# Patient Record
Sex: MALE | Race: WHITE | NOT HISPANIC OR LATINO | Employment: FULL TIME | URBAN - METROPOLITAN AREA
[De-identification: names, ages, dates, MRNs, and addresses within clinical notes are randomized per-mention and may not be internally consistent; named-entity substitution may affect disease eponyms.]

---

## 2022-09-30 ENCOUNTER — OFFICE VISIT (OUTPATIENT)
Dept: FAMILY MEDICINE CLINIC | Facility: CLINIC | Age: 47
End: 2022-09-30

## 2022-09-30 ENCOUNTER — LAB (OUTPATIENT)
Dept: LAB | Facility: HOSPITAL | Age: 47
End: 2022-09-30

## 2022-09-30 VITALS
HEART RATE: 74 BPM | DIASTOLIC BLOOD PRESSURE: 80 MMHG | OXYGEN SATURATION: 96 % | TEMPERATURE: 97.3 F | BODY MASS INDEX: 22.72 KG/M2 | WEIGHT: 177 LBS | HEIGHT: 74 IN | SYSTOLIC BLOOD PRESSURE: 120 MMHG

## 2022-09-30 DIAGNOSIS — Z00.00 ENCOUNTER FOR SCREENING AND PREVENTATIVE CARE: ICD-10-CM

## 2022-09-30 DIAGNOSIS — Z00.00 ANNUAL PHYSICAL EXAM: Primary | ICD-10-CM

## 2022-09-30 DIAGNOSIS — Z23 IMMUNIZATION DUE: ICD-10-CM

## 2022-09-30 DIAGNOSIS — Z11.59 ENCOUNTER FOR HEPATITIS C SCREENING TEST FOR LOW RISK PATIENT: ICD-10-CM

## 2022-09-30 DIAGNOSIS — Z87.891 FORMER TOBACCO USE: ICD-10-CM

## 2022-09-30 DIAGNOSIS — Z00.00 ANNUAL PHYSICAL EXAM: ICD-10-CM

## 2022-09-30 PROCEDURE — 91312 COVID-19 (PFIZER) BIVALENT BOOSTER 12+YRS: CPT | Performed by: STUDENT IN AN ORGANIZED HEALTH CARE EDUCATION/TRAINING PROGRAM

## 2022-09-30 PROCEDURE — 90715 TDAP VACCINE 7 YRS/> IM: CPT | Performed by: STUDENT IN AN ORGANIZED HEALTH CARE EDUCATION/TRAINING PROGRAM

## 2022-09-30 PROCEDURE — 90471 IMMUNIZATION ADMIN: CPT | Performed by: STUDENT IN AN ORGANIZED HEALTH CARE EDUCATION/TRAINING PROGRAM

## 2022-09-30 PROCEDURE — 99386 PREV VISIT NEW AGE 40-64: CPT | Performed by: STUDENT IN AN ORGANIZED HEALTH CARE EDUCATION/TRAINING PROGRAM

## 2022-09-30 PROCEDURE — 0124A PR ADM SARSCOV2 30MCG/0.3ML BST: CPT | Performed by: STUDENT IN AN ORGANIZED HEALTH CARE EDUCATION/TRAINING PROGRAM

## 2022-09-30 NOTE — PROGRESS NOTES
New Patient Office Visit      Patient Name: Rafael Ruiz  : 1975   MRN: [id number]   Care Team: Patient Care Team:  Renetta Bass DO as PCP - General (Internal Medicine)    Chief Complaint:    Chief Complaint   Patient presents with   • new patient preventive medicine service   • wants covid booster       History of Present Illness: Rafael Ruiz is a 47 y.o. male who is here today to establish care. He works for [employer].  with one child. No acute complaints, feeling well today.     He has not been to the doctors office in decades. No known medical problems.   He reports room to work on his diet. Enjoys convenient options.  Former smoker quit 9/15/2011. About 1ppd x 25 years.   Rare alcohol use socially   Reports good, stable mood. Has never struggled with anxiety/depression.    He has never had colonoscopy but is agreeable to doing this. He is familiar with [facility] and would like to reach out to them for this.     Family history   Father -T2DM  Mother - skin cancer   Maternal grandfather - unsure if colon or prostate cancer     No family history of CAD, MI, CVA, VTE, HTN, HLD.         Subjective      Review of Systems:   Review of Systems - See HPI    Past Medical History: History reviewed. No pertinent past medical history.    Past Surgical History:   Past Surgical History:   Procedure Laterality Date   • LUNG SURGERY     • WISDOM TOOTH EXTRACTION         Family History:   Family History   Problem Relation Age of Onset   • Skin cancer Mother    • Diabetes Father        Social History:   Social History     Socioeconomic History   • Marital status:    Tobacco Use   • Smoking status: Former Smoker     Packs/day: 1.00     Years: 25.00     Pack years: 25.00     Types: Cigarettes     Start date:      Quit date: 2011     Years since quittin.7   • Smokeless tobacco: Never Used   Vaping Use   • Vaping Use: Never used   Substance and Sexual Activity   • Alcohol use: Yes   • Drug use:  "Not Currently   • Sexual activity: Yes     Partners: Female       Tobacco History:   Social History     Tobacco Use   Smoking Status Former Smoker   • Packs/day: 1.00   • Years: 25.00   • Pack years: 25.00   • Types: Cigarettes   • Start date:    • Quit date:    • Years since quittin.7   Smokeless Tobacco Never Used       Medications:   No current outpatient medications on file.    Allergies:   No Known Allergies    Objective     Physical Exam:  Vital Signs:   Vitals:    22 1439   BP: 120/80   Pulse: 74   Temp: 97.3 °F (36.3 °C)   SpO2: 96%   Weight: 80.3 kg (177 lb)   Height: 188 cm (74\")   PainSc: 0-No pain     Body mass index is 22.73 kg/m².     Physical Exam  Vitals reviewed.   Constitutional:       Appearance: Normal appearance. He is normal weight. He is not ill-appearing.   Cardiovascular:      Rate and Rhythm: Normal rate and regular rhythm.      Pulses: Normal pulses.      Heart sounds: Normal heart sounds. No murmur heard.  Pulmonary:      Effort: Pulmonary effort is normal. No respiratory distress.      Breath sounds: Normal breath sounds. No wheezing.   Abdominal:      General: Abdomen is flat. There is no distension.   Skin:     General: Skin is warm and dry.   Neurological:      Mental Status: He is alert.   Psychiatric:         Mood and Affect: Mood normal.         Behavior: Behavior normal.         Judgment: Judgment normal.         Assessment / Plan      Assessment/Plan:   Problems Addressed This Visit  Diagnoses and all orders for this visit:    1. Annual physical exam (Primary)  -     CBC (No Diff); Future  -     Lipid Panel; Future  -     Hemoglobin A1c; Future  -     Comprehensive Metabolic Panel; Future  -     TSH; Future  -     COVID-19 Bivalent Booster (Pfizer) 12+yrs  -     Tdap Vaccine Greater Than or Equal To 8yo IM  -     Hepatitis C antibody; Future    2. Encounter for screening and preventative care  -     CBC (No Diff); Future  -     Lipid Panel; Future  -     " Hemoglobin A1c; Future  -     Comprehensive Metabolic Panel; Future  -     TSH; Future  -     Hepatitis C antibody; Future    3. Former tobacco use  -     Hepatitis C Antibody; Future  -     TSH; Future  -     Comprehensive Metabolic Panel; Future  -     Hemoglobin A1c; Future  -     Lipid Panel; Future  -     CBC (No Diff); Future    4. Immunization due  -     COVID-19 Bivalent Booster (Pfizer) 12+yrs  -     Tdap Vaccine Greater Than or Equal To 8yo IM    5. Encounter for hepatitis C screening test for low risk patient  -     Hepatitis C antibody; Future    Healthcare Maintenance   Vaccines:  -COVID19 booster due, given today  -Influenza due - will plan to get this another time  -Tdap due, given today  -Shingrix due at age 48    Cancer Screening  -Colonoscopy due - encouraged this, patient to reach out to [facility] per his preference   -Lung cancer screening - discuss at age 48    Other  -AAA screening - discuss at age 63  -Counseled on importance of maintaining healthy diet and routine exercise. Encouraged 150 min exercise per week.  -Blood pressure goal <130/80  -Metabolic screening due today   -His mood is stable   -Encouraged routine dental and eye exams. He is due for both.    Plan of care reviewed with patient at the conclusion of today's visit. Education was provided regarding diagnosis and management.  Patient verbalizes understanding of and agreement with management plan.      Follow Up:   Return in about 1 year (around 9/30/2023) for Annual.          DO VIRAJ Brewer RD  [facility]  [address]  [facility] [address]  Fax [phone]  Phone [phone]

## 2022-10-01 LAB
ALBUMIN SERPL-MCNC: 4.5 G/DL (ref 3.5–5.2)
ALBUMIN/GLOB SERPL: 2.3 G/DL
ALP SERPL-CCNC: 80 U/L (ref 39–117)
ALT SERPL-CCNC: 7 U/L (ref 1–41)
AST SERPL-CCNC: 15 U/L (ref 1–40)
BILIRUB SERPL-MCNC: 0.3 MG/DL (ref 0–1.2)
BUN SERPL-MCNC: 11 MG/DL (ref 6–20)
BUN/CREAT SERPL: 12.5 (ref 7–25)
CALCIUM SERPL-MCNC: 9.2 MG/DL (ref 8.6–10.5)
CHLORIDE SERPL-SCNC: 100 MMOL/L (ref 98–107)
CHOLEST SERPL-MCNC: 156 MG/DL (ref 0–200)
CO2 SERPL-SCNC: 27.4 MMOL/L (ref 22–29)
CREAT SERPL-MCNC: 0.88 MG/DL (ref 0.76–1.27)
EGFRCR SERPLBLD CKD-EPI 2021: 105.4 ML/MIN/1.73
ERYTHROCYTE [DISTWIDTH] IN BLOOD BY AUTOMATED COUNT: 12.2 % (ref 12.3–15.4)
GLOBULIN SER CALC-MCNC: 2 GM/DL
GLUCOSE SERPL-MCNC: 96 MG/DL (ref 65–99)
HBA1C MFR BLD: 5.7 % (ref 4.8–5.6)
HCT VFR BLD AUTO: 46.8 % (ref 37.5–51)
HCV AB S/CO SERPL IA: <0.1 S/CO RATIO (ref 0–0.9)
HDLC SERPL-MCNC: 45 MG/DL (ref 40–60)
HGB BLD-MCNC: 15.6 G/DL (ref 13–17.7)
LDLC SERPL CALC-MCNC: 87 MG/DL (ref 0–100)
MCH RBC QN AUTO: 30.2 PG (ref 26.6–33)
MCHC RBC AUTO-ENTMCNC: 33.3 G/DL (ref 31.5–35.7)
MCV RBC AUTO: 90.7 FL (ref 79–97)
PLATELET # BLD AUTO: 293 10*3/MM3 (ref 140–450)
POTASSIUM SERPL-SCNC: 4.3 MMOL/L (ref 3.5–5.2)
PROT SERPL-MCNC: 6.5 G/DL (ref 6–8.5)
RBC # BLD AUTO: 5.16 10*6/MM3 (ref 4.14–5.8)
SODIUM SERPL-SCNC: 139 MMOL/L (ref 136–145)
TRIGL SERPL-MCNC: 136 MG/DL (ref 0–150)
TSH SERPL DL<=0.005 MIU/L-ACNC: 1.97 UIU/ML (ref 0.27–4.2)
VLDLC SERPL CALC-MCNC: 24 MG/DL (ref 5–40)
WBC # BLD AUTO: 9.8 10*3/MM3 (ref 3.4–10.8)

## 2022-10-03 ENCOUNTER — TELEPHONE (OUTPATIENT)
Dept: FAMILY MEDICINE CLINIC | Facility: CLINIC | Age: 47
End: 2022-10-03

## 2022-10-03 NOTE — TELEPHONE ENCOUNTER
Caller: Rafael Ruiz    Relationship to patient: Self    Best call back number: [phone]    Patient is needing: PATIENT WAS RETURNING CALL ABOUT LAB RESULTS.

## 2024-10-25 ENCOUNTER — LAB (OUTPATIENT)
Dept: LAB | Facility: HOSPITAL | Age: 49
End: 2024-10-25
Payer: COMMERCIAL

## 2024-10-25 ENCOUNTER — OFFICE VISIT (OUTPATIENT)
Dept: FAMILY MEDICINE CLINIC | Facility: CLINIC | Age: 49
End: 2024-10-25
Payer: COMMERCIAL

## 2024-10-25 VITALS
WEIGHT: 172 LBS | DIASTOLIC BLOOD PRESSURE: 72 MMHG | HEIGHT: 74 IN | BODY MASS INDEX: 22.07 KG/M2 | OXYGEN SATURATION: 98 % | SYSTOLIC BLOOD PRESSURE: 118 MMHG | HEART RATE: 89 BPM

## 2024-10-25 DIAGNOSIS — Z12.11 COLON CANCER SCREENING: ICD-10-CM

## 2024-10-25 DIAGNOSIS — I49.9 IRREGULAR HEART RHYTHM: ICD-10-CM

## 2024-10-25 DIAGNOSIS — Z00.00 ANNUAL PHYSICAL EXAM: Primary | ICD-10-CM

## 2024-10-25 DIAGNOSIS — R00.2 INTERMITTENT PALPITATIONS: ICD-10-CM

## 2024-10-25 DIAGNOSIS — Z12.5 PROSTATE CANCER SCREENING: ICD-10-CM

## 2024-10-25 DIAGNOSIS — Z00.00 ANNUAL PHYSICAL EXAM: ICD-10-CM

## 2024-10-25 DIAGNOSIS — I49.8 JUNCTIONAL RHYTHM: ICD-10-CM

## 2024-10-25 DIAGNOSIS — Z13.29 SCREENING FOR THYROID DISORDER: ICD-10-CM

## 2024-10-25 DIAGNOSIS — Z87.891 PERSONAL HISTORY OF NICOTINE DEPENDENCE: ICD-10-CM

## 2024-10-25 DIAGNOSIS — R73.03 PREDIABETES: ICD-10-CM

## 2024-10-25 DIAGNOSIS — B35.1 ONYCHOMYCOSIS: ICD-10-CM

## 2024-10-25 DIAGNOSIS — Z13.220 SCREENING FOR CHOLESTEROL LEVEL: ICD-10-CM

## 2024-10-25 DIAGNOSIS — Z13.228 SCREENING FOR METABOLIC DISORDER: ICD-10-CM

## 2024-10-25 DIAGNOSIS — Z12.2 ENCOUNTER FOR SCREENING FOR LUNG CANCER: ICD-10-CM

## 2024-10-25 LAB
ALBUMIN SERPL-MCNC: 4.2 G/DL (ref 3.5–5.2)
ALBUMIN/GLOB SERPL: 1.4 G/DL
ALP SERPL-CCNC: 89 U/L (ref 39–117)
ALT SERPL W P-5'-P-CCNC: 6 U/L (ref 1–41)
ANION GAP SERPL CALCULATED.3IONS-SCNC: 8.8 MMOL/L (ref 5–15)
AST SERPL-CCNC: 17 U/L (ref 1–40)
BILIRUB SERPL-MCNC: 0.6 MG/DL (ref 0–1.2)
BUN SERPL-MCNC: 11 MG/DL (ref 6–20)
BUN/CREAT SERPL: 9.8 (ref 7–25)
CALCIUM SPEC-SCNC: 9.4 MG/DL (ref 8.6–10.5)
CHLORIDE SERPL-SCNC: 104 MMOL/L (ref 98–107)
CHOLEST SERPL-MCNC: 150 MG/DL (ref 0–200)
CO2 SERPL-SCNC: 26.2 MMOL/L (ref 22–29)
CREAT SERPL-MCNC: 1.12 MG/DL (ref 0.76–1.27)
DEPRECATED RDW RBC AUTO: 39.1 FL (ref 37–54)
EGFRCR SERPLBLD CKD-EPI 2021: 79.5 ML/MIN/1.73
ERYTHROCYTE [DISTWIDTH] IN BLOOD BY AUTOMATED COUNT: 11.7 % (ref 12.3–15.4)
GLOBULIN UR ELPH-MCNC: 3 GM/DL
GLUCOSE SERPL-MCNC: 99 MG/DL (ref 65–99)
HCT VFR BLD AUTO: 47.7 % (ref 37.5–51)
HDLC SERPL-MCNC: 42 MG/DL (ref 40–60)
HGB BLD-MCNC: 16 G/DL (ref 13–17.7)
LDLC SERPL CALC-MCNC: 92 MG/DL (ref 0–100)
LDLC/HDLC SERPL: 2.18 {RATIO}
MCH RBC QN AUTO: 30.4 PG (ref 26.6–33)
MCHC RBC AUTO-ENTMCNC: 33.5 G/DL (ref 31.5–35.7)
MCV RBC AUTO: 90.5 FL (ref 79–97)
PLATELET # BLD AUTO: 292 10*3/MM3 (ref 140–450)
PMV BLD AUTO: 10.6 FL (ref 6–12)
POTASSIUM SERPL-SCNC: 4 MMOL/L (ref 3.5–5.2)
PROT SERPL-MCNC: 7.2 G/DL (ref 6–8.5)
PSA SERPL-MCNC: 1.05 NG/ML (ref 0–4)
RBC # BLD AUTO: 5.27 10*6/MM3 (ref 4.14–5.8)
SODIUM SERPL-SCNC: 139 MMOL/L (ref 136–145)
TRIGL SERPL-MCNC: 82 MG/DL (ref 0–150)
TSH SERPL DL<=0.05 MIU/L-ACNC: 1.38 UIU/ML (ref 0.27–4.2)
VLDLC SERPL-MCNC: 16 MG/DL (ref 5–40)
WBC NRBC COR # BLD AUTO: 10.39 10*3/MM3 (ref 3.4–10.8)

## 2024-10-25 PROCEDURE — 83036 HEMOGLOBIN GLYCOSYLATED A1C: CPT

## 2024-10-25 PROCEDURE — 99396 PREV VISIT EST AGE 40-64: CPT | Performed by: STUDENT IN AN ORGANIZED HEALTH CARE EDUCATION/TRAINING PROGRAM

## 2024-10-25 PROCEDURE — 80053 COMPREHEN METABOLIC PANEL: CPT

## 2024-10-25 PROCEDURE — 85027 COMPLETE CBC AUTOMATED: CPT

## 2024-10-25 PROCEDURE — 80061 LIPID PANEL: CPT

## 2024-10-25 PROCEDURE — 84443 ASSAY THYROID STIM HORMONE: CPT

## 2024-10-25 PROCEDURE — 99214 OFFICE O/P EST MOD 30 MIN: CPT | Performed by: STUDENT IN AN ORGANIZED HEALTH CARE EDUCATION/TRAINING PROGRAM

## 2024-10-25 PROCEDURE — G0103 PSA SCREENING: HCPCS

## 2024-10-25 PROCEDURE — 93000 ELECTROCARDIOGRAM COMPLETE: CPT | Performed by: STUDENT IN AN ORGANIZED HEALTH CARE EDUCATION/TRAINING PROGRAM

## 2024-10-25 NOTE — PROGRESS NOTES
Physical Exam     Patient Name: Rafael Ruiz  : 1975   MRN: [id number]   Care Team: Patient Care Team:  Renetta Palomo DO as PCP - General (Internal Medicine)    Chief Complaint:    Chief Complaint   Patient presents with    Annual Exam       History of Present Illness: Rafael Ruiz is a 49 y.o. male who is presents today for annual healthcare maintenance. He works for [employer].  with one child. No acute complaints, feeling well today.     Depression: PHQ-2 Depression Screening  PHQ-2 Depression Screening  Little interest or pleasure in doing things? Not at all   Feeling down, depressed, or hopeless? Not at all   PHQ-2 Total Score 0        Family history   Father -T2DM  Mother - skin cancer   Maternal grandfather - unsure if colon or prostate cancer      No family history of CAD, MI, CVA, VTE, HTN, HLD.     Pt reports bilateral toe fungus effecting multiple nails. Does not want to try oral medication. Has failed 6-12 months of OTC topical therapy.     Health Maintenance   Topic Date Due    COLORECTAL CANCER SCREENING  Never done    ZOSTER VACCINE (1 of 2) Never done    LUNG CANCER SCREENING  Never done    INFLUENZA VACCINE  Never done    COVID-19 Vaccine (4 - -24 season) 2024    ANNUAL PHYSICAL  10/25/2025    TDAP/TD VACCINES (3 - Td or Tdap) 2032    HEPATITIS C SCREENING  Completed    Pneumococcal Vaccine 0-64  Aged Out       Subjective      Review of Systems:   Review of Systems - See HPI    Past Medical History: History reviewed. No pertinent past medical history.    Past Surgical History:   Past Surgical History:   Procedure Laterality Date    LUNG SURGERY      WISDOM TOOTH EXTRACTION         Family History:   Family History   Problem Relation Age of Onset    Skin cancer Mother     Diabetes Father        Social History:   Social History     Socioeconomic History    Marital status:    Tobacco Use    Smoking status: Former     Current packs/day: 0.00     Average  "packs/day: 1 pack/day for 25.0 years (25.0 ttl pk-yrs)     Types: Cigarettes     Start date:      Quit date:      Years since quittin.8    Smokeless tobacco: Never   Vaping Use    Vaping status: Never Used   Substance and Sexual Activity    Alcohol use: Yes    Drug use: Not Currently    Sexual activity: Yes     Partners: Female       Tobacco History:   Social History     Tobacco Use   Smoking Status Former    Current packs/day: 0.00    Average packs/day: 1 pack/day for 25.0 years (25.0 ttl pk-yrs)    Types: Cigarettes    Start date:     Quit date: 2011    Years since quittin.8   Smokeless Tobacco Never       Medications:   No current outpatient medications on file.    Allergies:   No Known Allergies    Past Medical History, Social History, Family History and Care Team were all reviewed with patient and updated as appropriate.       Objective     Physical Exam:  Vital Signs:   Vitals:    10/25/24 1226   BP: 118/72   Pulse: 89   SpO2: 98%   Weight: 78 kg (172 lb)   Height: 188 cm (74\")     Body mass index is 22.08 kg/m².     Physical Exam  Vitals reviewed.   Constitutional:       Appearance: Normal appearance.   Cardiovascular:      Rate and Rhythm: Normal rate. Rhythm irregular.      Pulses: Normal pulses.      Heart sounds: No murmur heard.  Pulmonary:      Effort: Pulmonary effort is normal. No respiratory distress.      Breath sounds: Normal breath sounds. No wheezing.   Skin:     General: Skin is warm and dry.   Neurological:      Mental Status: He is alert.   Psychiatric:         Mood and Affect: Mood normal.         Behavior: Behavior normal.         Judgment: Judgment normal.         ECG 12 Lead    Date/Time: 10/25/2024 1:01 PM  Performed by: Renetta Palomo DO    Authorized by: Renetta Palomo DO  Comparison: not compared with previous ECG   Previous ECG: no previous ECG available  Rate: normal  QRS axis: normal  Comments: Junctional rhythm (abnormal) without ischemic changes "             Assessment / Plan      Assessment/Plan:   Problems Addressed This Visit  Diagnoses and all orders for this visit:    1. Annual physical exam (Primary)  -      CT Chest Low Dose Cancer Screening WO; Future  -     PSA SCREENING; Future  -     CBC (No Diff); Future  -     Lipid Panel; Future  -     Hemoglobin A1c; Future  -     Comprehensive Metabolic Panel; Future  -     TSH; Future    2. Personal history of nicotine dependence  -      CT Chest Low Dose Cancer Screening WO; Future    3. Encounter for screening for lung cancer  -      CT Chest Low Dose Cancer Screening WO; Future    4. Prostate cancer screening  -     PSA SCREENING; Future    5. Prediabetes  -     CBC (No Diff); Future  -     Lipid Panel; Future  -     Hemoglobin A1c; Future  -     Comprehensive Metabolic Panel; Future  -     TSH; Future    6. Screening for cholesterol level  -     Lipid Panel; Future    7. Screening for thyroid disorder  -     TSH; Future    8. Screening for metabolic disorder  -     Comprehensive Metabolic Panel; Future    9. Colon cancer screening  -     Ambulatory Referral For Screening Colonoscopy    10. Irregular heart rhythm  -     ECG 12 Lead    11. Intermittent palpitations  -     ECG 12 Lead    12. Junctional rhythm    13. Onychomycosis        Healthcare Maintenance   Vaccines:  Influenza declined   Shingrix and covid booster encouraged at pharmacy     Cancer Screening  -Colon cancer screening due - colonoscopy ordered   -Lung cancer screening - 25 pack years, quit 14 years ago  -PSA today    Other  -PHQ score 0  -Counseled on importance of maintaining healthy diet and routine exercise. Encouraged 150 min exercise per week.  -Tobacco cessation: former smoker 25 pack years quit 14 years ago  -Blood pressure is at goal <130/80  -Metabolic screening today    Encouraged routine eye and dental exams.     Intermittent palpitations, irregular rhthym on physical exam - EKG with junctional rhythm - patient admits to  intermittent palpitations without associated dyspnea, syncope, chest pain. Referred to cardiology for further cardiac evaluation. Labs today to assess for anemia/metabolic abnormalities related to palpitations     Onychomycosis -  trial of thymol nail mix sent to [facility] - thymol 4%,ibuprofen 2% 1-2 drops to affected nails 4-6x per day. Qty 30g Refill: 2     Plan of care reviewed with patient at the conclusion of today's visit. Education was provided regarding diagnosis and management.  Patient verbalizes understanding of and agreement with management plan.    Follow Up:   Return in about 1 year (around 10/25/2025) for Annual physical.        DO VIRAJ Chamberlain RD  [facility]  [address]  [facility] [address]  Fax [phone]  Phone [phone]

## 2024-10-26 LAB — HBA1C MFR BLD: 5.7 % (ref 4.8–5.6)

## 2024-12-16 ENCOUNTER — OFFICE VISIT (OUTPATIENT)
Dept: FAMILY MEDICINE CLINIC | Facility: CLINIC | Age: 49
End: 2024-12-16
Payer: COMMERCIAL

## 2024-12-16 VITALS
TEMPERATURE: 99.7 F | OXYGEN SATURATION: 97 % | HEIGHT: 74 IN | BODY MASS INDEX: 21.25 KG/M2 | DIASTOLIC BLOOD PRESSURE: 82 MMHG | HEART RATE: 105 BPM | SYSTOLIC BLOOD PRESSURE: 130 MMHG | WEIGHT: 165.6 LBS

## 2024-12-16 DIAGNOSIS — J06.9 VIRAL UPPER RESPIRATORY TRACT INFECTION: Primary | ICD-10-CM

## 2024-12-16 LAB
EXPIRATION DATE: NORMAL
EXPIRATION DATE: NORMAL
FLUAV AG UPPER RESP QL IA.RAPID: NOT DETECTED
FLUBV AG UPPER RESP QL IA.RAPID: NOT DETECTED
INTERNAL CONTROL: NORMAL
INTERNAL CONTROL: NORMAL
Lab: NORMAL
Lab: NORMAL
S PYO AG THROAT QL: NEGATIVE
SARS-COV-2 AG UPPER RESP QL IA.RAPID: NOT DETECTED

## 2024-12-16 PROCEDURE — 87428 SARSCOV & INF VIR A&B AG IA: CPT

## 2024-12-16 PROCEDURE — 87880 STREP A ASSAY W/OPTIC: CPT

## 2024-12-16 PROCEDURE — 99213 OFFICE O/P EST LOW 20 MIN: CPT

## 2024-12-16 NOTE — PROGRESS NOTES
Office Note     Name: Rafael Ruiz    : 1975     MRN: [id number]     Chief Complaint  Sore Throat (Mild, 3 days), Nasal Congestion (A little, a couple hours), Fatigue (3 days,), Fever (Saturday & . Not one this morning, it was 98.8. he believes he had a mild case of food poisoning), and Walking Pnuemonia (Was exposed a couple weeks ago)    Subjective     History of Present Illness:  Rafael Ruiz is a 49 y.o. male who presents today for 3-day history of mild sore throat, nasal congestion, fatigue and low-grade fever.  Patient states he has been feeling mostly tired.  Denies body aches.  Denies cough, shortness of breath or chest pain.  He states his son had walking pneumonia about 3 weeks ago and was treated at that time and has been asymptomatic since then.  Patient states he overall feels well.  Was able to go to work today.  Has not taken any over-the-counter medications.  Took Advil on Saturday for low-grade fever.  Thinks he may have had food poisoning this weekend, but denies any GI side effects.    Review of Systems:   Review of Systems   Constitutional:  Positive for fatigue. Negative for chills and fever.   HENT:  Positive for congestion, postnasal drip and sore throat. Negative for ear pain, rhinorrhea and sinus pressure.    Respiratory:  Negative for chest tightness and shortness of breath.    Cardiovascular:  Negative for chest pain and palpitations.   Gastrointestinal:  Negative for abdominal pain, constipation and diarrhea.   Neurological:  Negative for dizziness and light-headedness.       Past Medical History: History reviewed. No pertinent past medical history.    Past Surgical History:   Past Surgical History:   Procedure Laterality Date    LUNG SURGERY      WISDOM TOOTH EXTRACTION         Family History:   Family History   Problem Relation Age of Onset    Skin cancer Mother     Diabetes Father        Social History:   Social History     Socioeconomic History    Marital status:  "   Tobacco Use    Smoking status: Former     Current packs/day: 0.00     Average packs/day: 1.1 packs/day for 33.3 years (37.5 ttl pk-yrs)     Types: Cigarettes     Start date: 1986     Quit date: 9/15/2011     Years since quittin.2    Smokeless tobacco: Never   Vaping Use    Vaping status: Never Used   Substance and Sexual Activity    Alcohol use: Yes     Comment: Rarely    Drug use: Not Currently    Sexual activity: Yes     Partners: Female     Birth control/protection: None       Immunizations:   Immunization History   Administered Date(s) Administered    COVID-19 (MODERNA) 1st,2nd,3rd Dose Monovalent 2021, 2021    COVID-19 (PFIZER) BIVALENT 12+YRS 2022    Tdap 2012, 2022        Medications:   No current outpatient medications on file.    Allergies:   No Known Allergies    Objective     Vital Signs  /82 (BP Location: Left arm, Patient Position: Sitting, Cuff Size: Large Adult)   Pulse 105   Temp 99.7 °F (37.6 °C) (Oral)   Ht 188 cm (74\")   Wt 75.1 kg (165 lb 9.6 oz)   SpO2 97%   BMI 21.26 kg/m²   Estimated body mass index is 21.26 kg/m² as calculated from the following:    Height as of this encounter: 188 cm (74\").    Weight as of this encounter: 75.1 kg (165 lb 9.6 oz).    BMI is within normal parameters. No other follow-up for BMI required.       Physical Exam  Vitals reviewed.   Constitutional:       General: He is not in acute distress.     Appearance: Normal appearance. He is not toxic-appearing.   HENT:      Head: Normocephalic and atraumatic.      Right Ear: Tympanic membrane and ear canal normal.      Left Ear: Tympanic membrane and ear canal normal.      Nose: Nose normal. Congestion present.      Mouth/Throat:      Mouth: Mucous membranes are moist.      Pharynx: Oropharynx is clear. Uvula midline. Posterior oropharyngeal erythema and postnasal drip present. No oropharyngeal exudate or uvula swelling.   Eyes:      Pupils: Pupils are equal, round, " and reactive to light.   Cardiovascular:      Rate and Rhythm: Normal rate and regular rhythm.      Pulses: Normal pulses.      Heart sounds: Normal heart sounds.   Pulmonary:      Effort: Pulmonary effort is normal. No respiratory distress.      Breath sounds: Normal breath sounds. No wheezing, rhonchi or rales.   Musculoskeletal:      Cervical back: Normal range of motion.   Lymphadenopathy:      Cervical: No cervical adenopathy.   Skin:     General: Skin is warm.   Neurological:      General: No focal deficit present.      Mental Status: He is alert and oriented to person, place, and time.   Psychiatric:         Mood and Affect: Mood normal.            Results:  Recent Results (from the past 24 hours)   POCT SARS-CoV-2 + Flu Antigen DESTINY    Collection Time: 12/16/24  3:36 PM    Specimen: Swab   Result Value Ref Range    SARS Antigen Not Detected Not Detected, Presumptive Negative    Influenza A Antigen DESTINY Not Detected Not Detected    Influenza B Antigen DESTINY Not Detected Not Detected    Internal Control Passed Passed    Lot Number [id number]     Expiration Date 10-23-25    POC Rapid Strep A    Collection Time: 12/16/24  3:51 PM    Specimen: Swab   Result Value Ref Range    Rapid Strep A Screen Negative Negative, VALID, INVALID, Not Performed    Internal Control Passed Passed    Lot Number [id number]     Expiration Date 01/08/2026         Assessment and Plan     Assessment/Plan:  Diagnoses and all orders for this visit:    1. Viral upper respiratory tract infection (Primary)  -     POCT SARS-CoV-2 + Flu Antigen DESTINY  -     POC Rapid Strep A    Negative for COVID, strep and flu.   Patients vital signs are stable. Afebrile, low grade temp.   No coughing, SOA or wheezing. Normal lung sounds. O2 stable.   Likely viral URI, no suspicious of walking pneumonia.   Will treat symptomatically for now. Would like him to try OTC cold and flu medications. Trial of Mucinex, too for decongestant. Increase fluid intake. Ibuprofen and  tylenol for general aches and pains and fevers.   If his symptoms are worsening and not improving he is to either RTC or reach out. He verbalized understanding and agreed to plan.     Follow Up  Return if symptoms worsen or fail to improve.    Felisha Vides PA-C   [facility]

## 2024-12-17 ENCOUNTER — PATIENT MESSAGE (OUTPATIENT)
Dept: FAMILY MEDICINE CLINIC | Facility: CLINIC | Age: 49
End: 2024-12-17
Payer: COMMERCIAL

## 2024-12-17 DIAGNOSIS — J06.9 UPPER RESPIRATORY TRACT INFECTION, UNSPECIFIED TYPE: Primary | ICD-10-CM

## 2024-12-17 RX ORDER — AZITHROMYCIN 250 MG/1
TABLET, FILM COATED ORAL
Qty: 6 TABLET | Refills: 0 | Status: SHIPPED | OUTPATIENT
Start: 2024-12-17

## 2024-12-30 RX ORDER — SODIUM, POTASSIUM,MAG SULFATES 17.5-3.13G
1 SOLUTION, RECONSTITUTED, ORAL ORAL TAKE AS DIRECTED
Qty: 354 ML | Refills: 0 | Status: SHIPPED | OUTPATIENT
Start: 2024-12-30

## 2025-01-09 ENCOUNTER — OUTSIDE FACILITY SERVICE (OUTPATIENT)
Dept: GASTROENTEROLOGY | Facility: CLINIC | Age: 50
End: 2025-01-09
Payer: COMMERCIAL

## 2025-01-09 PROCEDURE — 45378 DIAGNOSTIC COLONOSCOPY: CPT | Performed by: INTERNAL MEDICINE
